# Patient Record
Sex: FEMALE | Race: WHITE | NOT HISPANIC OR LATINO | Employment: OTHER | URBAN - METROPOLITAN AREA
[De-identification: names, ages, dates, MRNs, and addresses within clinical notes are randomized per-mention and may not be internally consistent; named-entity substitution may affect disease eponyms.]

---

## 2023-02-10 PROBLEM — H91.90 HEARING DECREASED: Status: ACTIVE | Noted: 2023-02-10

## 2023-02-10 PROBLEM — E66.9 CLASS 1 OBESITY WITH BODY MASS INDEX (BMI) OF 32.0 TO 32.9 IN ADULT: Status: ACTIVE | Noted: 2023-02-10

## 2023-02-10 PROBLEM — H61.23 BILATERAL IMPACTED CERUMEN: Status: ACTIVE | Noted: 2023-02-10

## 2023-02-10 PROBLEM — H60.8X2 CHRONIC ECZEMATOUS OTITIS EXTERNA OF LEFT EAR: Status: ACTIVE | Noted: 2023-02-10

## 2023-02-10 PROBLEM — N18.31 STAGE 3A CHRONIC KIDNEY DISEASE (MULTI): Status: ACTIVE | Noted: 2023-02-10

## 2023-02-10 PROBLEM — R60.9 EDEMA: Status: ACTIVE | Noted: 2023-02-10

## 2023-02-10 PROBLEM — E66.811 CLASS 1 OBESITY WITH BODY MASS INDEX (BMI) OF 32.0 TO 32.9 IN ADULT: Status: ACTIVE | Noted: 2023-02-10

## 2023-02-10 PROBLEM — E03.9 HYPOTHYROIDISM: Status: ACTIVE | Noted: 2023-02-10

## 2023-02-10 PROBLEM — N95.1 VASOMOTOR SYMPTOMS DUE TO MENOPAUSE: Status: ACTIVE | Noted: 2023-02-10

## 2023-02-10 PROBLEM — J30.9 ALLERGIC RHINITIS: Status: ACTIVE | Noted: 2023-02-10

## 2023-02-10 PROBLEM — M54.50 LUMBAR PAIN: Status: ACTIVE | Noted: 2023-02-10

## 2023-02-10 PROBLEM — B37.2 CANDIDOSIS OF SKIN: Status: ACTIVE | Noted: 2023-02-10

## 2023-02-10 PROBLEM — G47.00 INSOMNIA: Status: ACTIVE | Noted: 2023-02-10

## 2023-02-10 PROBLEM — M81.0 SENILE OSTEOPOROSIS: Status: ACTIVE | Noted: 2023-02-10

## 2023-02-10 PROBLEM — E78.5 HLD (HYPERLIPIDEMIA): Status: ACTIVE | Noted: 2023-02-10

## 2023-02-10 PROBLEM — R31.29 MICROSCOPIC HEMATURIA: Status: ACTIVE | Noted: 2023-02-10

## 2023-02-10 PROBLEM — E55.9 VITAMIN D DEFICIENCY: Status: ACTIVE | Noted: 2023-02-10

## 2023-02-10 PROBLEM — L20.9 ATOPIC DERMATITIS: Status: ACTIVE | Noted: 2023-02-10

## 2023-02-10 PROBLEM — F32.A DEPRESSION: Status: ACTIVE | Noted: 2023-02-10

## 2023-02-10 PROBLEM — I10 HTN (HYPERTENSION): Status: ACTIVE | Noted: 2023-02-10

## 2023-02-10 PROBLEM — F41.9 ANXIETY: Status: ACTIVE | Noted: 2023-02-10

## 2023-02-10 PROBLEM — R53.83 FATIGUE: Status: ACTIVE | Noted: 2023-02-10

## 2023-02-10 PROBLEM — I48.91 A-FIB (MULTI): Status: ACTIVE | Noted: 2023-02-10

## 2023-02-10 PROBLEM — M25.532 LEFT WRIST PAIN: Status: ACTIVE | Noted: 2023-02-10

## 2023-02-10 RX ORDER — MINERAL OIL
180 ENEMA (ML) RECTAL DAILY PRN
COMMUNITY
Start: 2020-10-01

## 2023-02-10 RX ORDER — FUROSEMIDE 20 MG/1
20 TABLET ORAL AS NEEDED
COMMUNITY
Start: 2021-10-26

## 2023-02-10 RX ORDER — DOFETILIDE 0.25 MG/1
250 CAPSULE ORAL 2 TIMES DAILY
COMMUNITY
Start: 2018-03-26 | End: 2023-05-08 | Stop reason: ALTCHOICE

## 2023-02-10 RX ORDER — LOSARTAN POTASSIUM 50 MG/1
50 TABLET ORAL DAILY
COMMUNITY

## 2023-02-10 RX ORDER — PITAVASTATIN CALCIUM 2.09 MG/1
2 TABLET, FILM COATED ORAL DAILY
COMMUNITY
Start: 2022-05-19

## 2023-02-10 RX ORDER — ERGOCALCIFEROL 1.25 MG/1
1 CAPSULE ORAL
COMMUNITY
Start: 2020-03-29 | End: 2023-05-04 | Stop reason: SDUPTHER

## 2023-02-10 RX ORDER — ESTRADIOL 0.05 MG/D
1 FILM, EXTENDED RELEASE TRANSDERMAL
COMMUNITY
Start: 2015-11-23 | End: 2023-05-04 | Stop reason: SDUPTHER

## 2023-02-10 RX ORDER — FLUOXETINE 10 MG/1
1 CAPSULE ORAL DAILY
COMMUNITY
Start: 2022-05-19 | End: 2023-03-10

## 2023-02-10 RX ORDER — THYROID 60 MG/1
60 TABLET ORAL DAILY
COMMUNITY
Start: 2016-08-22 | End: 2023-04-21 | Stop reason: SDUPTHER

## 2023-03-18 DIAGNOSIS — E03.9 HYPOTHYROIDISM, UNSPECIFIED TYPE: ICD-10-CM

## 2023-03-18 DIAGNOSIS — E78.5 HYPERLIPIDEMIA, UNSPECIFIED HYPERLIPIDEMIA TYPE: ICD-10-CM

## 2023-03-18 DIAGNOSIS — I10 HYPERTENSION, UNSPECIFIED TYPE: ICD-10-CM

## 2023-03-23 ENCOUNTER — APPOINTMENT (OUTPATIENT)
Dept: PRIMARY CARE | Facility: CLINIC | Age: 83
End: 2023-03-23
Payer: MEDICARE

## 2023-04-05 DIAGNOSIS — F32.A DEPRESSION, UNSPECIFIED: ICD-10-CM

## 2023-04-05 RX ORDER — FLUOXETINE 10 MG/1
CAPSULE ORAL
Qty: 30 CAPSULE | Refills: 0 | OUTPATIENT
Start: 2023-04-05

## 2023-04-21 DIAGNOSIS — E03.9 ACQUIRED HYPOTHYROIDISM: Primary | ICD-10-CM

## 2023-04-21 RX ORDER — THYROID 60 MG/1
60 TABLET ORAL
Qty: 30 TABLET | Refills: 0 | Status: SHIPPED | OUTPATIENT
Start: 2023-04-21 | End: 2023-05-04 | Stop reason: SDUPTHER

## 2023-04-21 NOTE — TELEPHONE ENCOUNTER
Refill(s) requested for: Yasmeen Thyroid (60 mg)    Pharmacy: [facility]  Pharmacy Address: [id number] [facility] in [place]    LR: 09/22/2022  LV: 09/22/2022  NV: 05/04/2023    PT IS COMPLETELY OUT AND NEEDS A 30 DAY SUPPLY FROM

## 2023-04-24 ENCOUNTER — TELEPHONE (OUTPATIENT)
Dept: PRIMARY CARE | Facility: CLINIC | Age: 83
End: 2023-04-24
Payer: MEDICARE

## 2023-05-04 ENCOUNTER — OFFICE VISIT (OUTPATIENT)
Dept: PRIMARY CARE | Facility: CLINIC | Age: 83
End: 2023-05-04
Payer: MEDICARE

## 2023-05-04 VITALS
WEIGHT: 168 LBS | BODY MASS INDEX: 30.91 KG/M2 | SYSTOLIC BLOOD PRESSURE: 126 MMHG | OXYGEN SATURATION: 95 % | HEIGHT: 62 IN | HEART RATE: 98 BPM | DIASTOLIC BLOOD PRESSURE: 78 MMHG

## 2023-05-04 DIAGNOSIS — M75.102 TEAR OF LEFT ROTATOR CUFF, UNSPECIFIED TEAR EXTENT, UNSPECIFIED WHETHER TRAUMATIC: ICD-10-CM

## 2023-05-04 DIAGNOSIS — E78.5 HYPERLIPIDEMIA, UNSPECIFIED HYPERLIPIDEMIA TYPE: ICD-10-CM

## 2023-05-04 DIAGNOSIS — E66.09 CLASS 1 OBESITY DUE TO EXCESS CALORIES WITH SERIOUS COMORBIDITY AND BODY MASS INDEX (BMI) OF 30.0 TO 30.9 IN ADULT: ICD-10-CM

## 2023-05-04 DIAGNOSIS — J30.89 ENVIRONMENTAL AND SEASONAL ALLERGIES: ICD-10-CM

## 2023-05-04 DIAGNOSIS — F32.A DEPRESSION, UNSPECIFIED DEPRESSION TYPE: ICD-10-CM

## 2023-05-04 DIAGNOSIS — F41.9 ANXIETY: ICD-10-CM

## 2023-05-04 DIAGNOSIS — N95.1 VASOMOTOR SYMPTOMS DUE TO MENOPAUSE: ICD-10-CM

## 2023-05-04 DIAGNOSIS — I10 HYPERTENSION, UNSPECIFIED TYPE: ICD-10-CM

## 2023-05-04 DIAGNOSIS — I48.20 CHRONIC ATRIAL FIBRILLATION (MULTI): ICD-10-CM

## 2023-05-04 DIAGNOSIS — E03.9 ACQUIRED HYPOTHYROIDISM: ICD-10-CM

## 2023-05-04 DIAGNOSIS — E55.9 VITAMIN D DEFICIENCY: ICD-10-CM

## 2023-05-04 DIAGNOSIS — Z00.00 ENCOUNTER FOR MEDICARE ANNUAL WELLNESS EXAM: Primary | ICD-10-CM

## 2023-05-04 DIAGNOSIS — N18.31 STAGE 3A CHRONIC KIDNEY DISEASE (MULTI): ICD-10-CM

## 2023-05-04 PROCEDURE — 1036F TOBACCO NON-USER: CPT | Performed by: NURSE PRACTITIONER

## 2023-05-04 PROCEDURE — 1160F RVW MEDS BY RX/DR IN RCRD: CPT | Performed by: NURSE PRACTITIONER

## 2023-05-04 PROCEDURE — 1159F MED LIST DOCD IN RCRD: CPT | Performed by: NURSE PRACTITIONER

## 2023-05-04 PROCEDURE — 1170F FXNL STATUS ASSESSED: CPT | Performed by: NURSE PRACTITIONER

## 2023-05-04 PROCEDURE — 3078F DIAST BP <80 MM HG: CPT | Performed by: NURSE PRACTITIONER

## 2023-05-04 PROCEDURE — 99214 OFFICE O/P EST MOD 30 MIN: CPT | Performed by: NURSE PRACTITIONER

## 2023-05-04 PROCEDURE — G0439 PPPS, SUBSEQ VISIT: HCPCS | Performed by: NURSE PRACTITIONER

## 2023-05-04 PROCEDURE — 3074F SYST BP LT 130 MM HG: CPT | Performed by: NURSE PRACTITIONER

## 2023-05-04 PROCEDURE — 99497 ADVNCD CARE PLAN 30 MIN: CPT | Performed by: NURSE PRACTITIONER

## 2023-05-04 RX ORDER — ERGOCALCIFEROL 1.25 MG/1
1 CAPSULE ORAL
Qty: 12 CAPSULE | Refills: 3 | Status: SHIPPED | OUTPATIENT
Start: 2023-05-04 | End: 2023-10-31 | Stop reason: SDUPTHER

## 2023-05-04 RX ORDER — FLUOXETINE 10 MG/1
10 CAPSULE ORAL DAILY
Qty: 90 CAPSULE | Refills: 1 | Status: SHIPPED | OUTPATIENT
Start: 2023-05-04 | End: 2023-10-02

## 2023-05-04 RX ORDER — ESTRADIOL 0.05 MG/D
1 FILM, EXTENDED RELEASE TRANSDERMAL
Qty: 8 PATCH | Refills: 5 | Status: SHIPPED | OUTPATIENT
Start: 2023-05-04 | End: 2023-10-02

## 2023-05-04 RX ORDER — THYROID 60 MG/1
60 TABLET ORAL
Qty: 90 TABLET | Refills: 1 | Status: SHIPPED | OUTPATIENT
Start: 2023-05-04 | End: 2023-10-31 | Stop reason: SDUPTHER

## 2023-05-04 RX ORDER — METOPROLOL SUCCINATE 100 MG/1
200 TABLET, EXTENDED RELEASE ORAL DAILY
COMMUNITY
Start: 2022-11-11

## 2023-05-04 ASSESSMENT — PATIENT HEALTH QUESTIONNAIRE - PHQ9
2. FEELING DOWN, DEPRESSED OR HOPELESS: NOT AT ALL
1. LITTLE INTEREST OR PLEASURE IN DOING THINGS: NOT AT ALL
SUM OF ALL RESPONSES TO PHQ9 QUESTIONS 1 & 2: 0

## 2023-05-04 NOTE — PROGRESS NOTES
General Medical Management Note     82 y.o. female presents for Medical Management and MWV    HPI    Diet:    mix of healthy and unhealthy  Caffeine per day: denies  Water per day:  5 cups  Exercise: PT  Alcohol: 1-2 drinks per month  Tobacco: denies  Mammogram: 12-14-21  Colonoscopy:  Date            Repeat              Provider  Cologuard:     HTN, Hyperlipidemia, A-Fib and carotid stenosis:   Managed by EPS Dr. Magy Lopez and cardio Dr Pradeep Estbean   Recently in hospital for pericarditis [facility]     Hypothyroidism:   Managed with med:  [facility].  Denies tremors, increased insomnia, diarrhea, constipation, or changes in hair.      Vasomotor symptoms of menopause:   Managed with Estradiol.   Pt does not wish to come off of this medication at this time.   Aware of the concerns about being on long term.      Environmental and Seasonal:   Managed with Allegra as needed.      Microscopic hematuria:   Previously evaluated by Urology Dr Yosvany Renner. On Eliquis.     Torn Lt shoulder rotator cuff:  Dr [facility]  Working with PT      Anxiety and Depression:   Managed with Prozac   Feels very stable on medication  Denies thoughts of suicide or homicide      has bipolar disorder    Past Medical History:   Diagnosis Date    Old myocardial infarction     History of non-ST elevation myocardial infarction (NSTEMI)    Other specified symptoms and signs involving the circulatory and respiratory systems 05/24/2016    Abnormal lung sounds    Personal history of other diseases of the respiratory system 05/24/2016    History of acute sinusitis    Personal history of other diseases of urinary system 10/04/2018    History of hematuria    Personal history of other medical treatment     History of bone density study    Personal history of other medical treatment     History of mammogram      Past Surgical History:   Procedure Laterality Date    APPENDECTOMY  05/21/2015    Appendectomy    OTHER SURGICAL HISTORY   05/21/2015    Cardiac Cath Lesion 1, 1st Adjunct Treat Device: Stent    OTHER SURGICAL HISTORY  10/20/2019    Complete colonoscopy    OTHER SURGICAL HISTORY  03/30/2022    Cataract surgery    OTHER SURGICAL HISTORY  09/21/2021    Cardioversion    OTHER SURGICAL HISTORY  03/29/2020    Cystourethroscopy    TOTAL ABDOMINAL HYSTERECTOMY  05/21/2015    Total Abdominal Hysterectomy     Family History   Problem Relation Name Age of Onset    Stroke Mother      Coronary artery disease Mother      Coronary artery disease Father        Social History     Socioeconomic History    Marital status:      Spouse name: Not on file    Number of children: Not on file    Years of education: Not on file    Highest education level: Not on file   Occupational History    Not on file   Tobacco Use    Smoking status: Never    Smokeless tobacco: Never   Vaping Use    Vaping status: Not on file   Substance and Sexual Activity    Alcohol use: Yes    Drug use: Never    Sexual activity: Not on file   Other Topics Concern    Not on file   Social History Narrative    Not on file     Social Determinants of Health     Financial Resource Strain: Not on file   Food Insecurity: Not on file   Transportation Needs: Not on file   Physical Activity: Not on file   Stress: Not on file   Social Connections: Not on file   Intimate Partner Violence: Not on file   Housing Stability: Not on file     Social History     Socioeconomic History    Marital status:      Spouse name: Not on file    Number of children: Not on file    Years of education: Not on file    Highest education level: Not on file   Occupational History    Not on file   Tobacco Use    Smoking status: Never    Smokeless tobacco: Never   Vaping Use    Vaping status: Not on file   Substance and Sexual Activity    Alcohol use: Yes    Drug use: Never    Sexual activity: Not on file   Other Topics Concern    Not on file   Social History Narrative    Not on file     Social Determinants of  "Health     Financial Resource Strain: Not on file   Food Insecurity: Not on file   Transportation Needs: Not on file   Physical Activity: Not on file   Stress: Not on file   Social Connections: Not on file   Intimate Partner Violence: Not on file   Housing Stability: Not on file      Current Outpatient Medications on File Prior to Visit   Medication Sig Dispense Refill    apixaban (Eliquis) 5 mg tablet Take 1 tablet (5 mg) by mouth in the morning and at bedtime.      dofetilide (Tikosyn) 250 mcg capsule Take 1 capsule (250 mcg) by mouth in the morning and at bedtime.      ergocalciferol (Vitamin D-2) 1.25 MG (87951 UT) capsule Take 1 capsule (1,250 mcg) by mouth 1 (one) time per week.      estradiol (Vivelle-DOT) 0.05 mg/24 hr patch Place 1 patch on the skin. Twice weekly as directed      fexofenadine (Allegra Allergy) 180 mg tablet Take 1 tablet (180 mg) by mouth if needed each day (allergies).      FLUoxetine (PROzac) 10 mg capsule Take 1 capsule (10 mg) by mouth once daily. 30 capsule 0    furosemide (Lasix) 20 mg tablet Take 1 tablet (20 mg) by mouth if needed.      losartan (Cozaar) 50 mg tablet Take 1 tablet (50 mg) by mouth 1 (one) time each day. EPS Dr. LJ Lopez      pitavastatin calcium (Livalo) 2 mg tablet Take 2 mg by mouth 1 (one) time each day.      thyroid, pork, ([place] Thyroid) 60 mg tablet Take 1 tablet (60 mg) by mouth once daily in the morning. Take before meals. 6 days a week 30 tablet 0     No current facility-administered medications on file prior to visit.       Allergies   Allergen Reactions    Azithromycin Other     Gastrointestinal upset    Bupropion Hcl Palpitations     Tachycardia           ROS: Denies chest pain, SOB, Headache, GI problems     Visit Vitals  /78   Pulse 98   Ht 1.575 m (5' 2\")   Wt 76.2 kg (168 lb)   SpO2 95%   BMI 30.73 kg/m²   Smoking Status Never   BSA 1.83 m²      Reviewed [facility] notes        PHYSICAL EXAM:  Alert and oriented x3.  Eyes: EOM grossly intact  Neck " supple without lymph adenopathy or carotid bruit.  No masses or thyromegaly  Heart regular rate and rhythm without murmur.  Lungs clear to auscultation.  Legs without edema.  Gait is non-antalgic  Speech clear.  Hearing adequate.          DIAGNOSIS/PLAN:    1. Encounter for Medicare annual wellness exam    2. Hypertension, unspecified type  Follow-up with specialist per their discretion/direction.      3. Hyperlipidemia, unspecified hyperlipidemia type  Follow-up with specialist per their discretion/direction.      4. Chronic atrial fibrillation (CMS/HCC)  Follow-up with specialist per their discretion/direction.    5. Vasomotor symptoms due to menopause  Stable.  Continue current medications.   - estradiol (Vivelle-DOT) 0.05 mg/24 hr patch; Place 1 patch on the skin 1 (one) time per week. Twice weekly as directed  Dispense: 8 patch; Refill: 5    6. Environmental and seasonal allergies  Stable.  Continue current medications.     7. Anxiety  Stable.   Continue current medication/treatment plan.     Aware at any time if thoughts of suicide or homicide are present contact medical services immediately.   - FLUoxetine (PROzac) 10 mg capsule; Take 1 capsule (10 mg) by mouth once daily.  Dispense: 90 capsule; Refill: 1    8. Depression, unspecified depression type  Stable.   Continue current medication/treatment plan.     Aware at any time if thoughts of suicide or homicide are present contact medical services immediately.     9. Acquired hypothyroidism  Continue current medication.   If thyroid labs ordered, adjustments will be made if appopriate.   - thyroid, pork, ([place] Thyroid) 60 mg tablet; Take 1 tablet (60 mg) by mouth once daily in the morning. Take before meals. 6 days a week  Dispense: 90 tablet; Refill: 1    10. Vitamin D deficiency  - ergocalciferol (Vitamin D-2) 1.25 MG (31829 UT) capsule; Take 1 capsule (1,250 mcg) by mouth 1 (one) time per week.  Dispense: 12 capsule; Refill: 3    11. Stage 3a chronic  kidney disease  Stable. We will continue to monitor, evaluate, assess and treat.    12. Tear of left rotator cuff, unspecified tear extent, unspecified whether traumatic  Follow up with orthopedic    13. Class 1 obesity due to excess calories with serious comorbidity and body mass index (BMI) of 30.0 to 30.9 in adult  Patient encouraged to follow diet of lower carbohydrates and increase vegetable and fruit intake.   Patient also encouraged to increase water intake to 80 ounces/day.   Start or continue exercise for at least 30 minutes a day on most days of the week.     offers various ways to learn about healthy eating and healthy weight loss.      Medications refills will be completed as discussed.     Any labs or testing that is ordered will be reviewed and the results will be in your chart .   You can review these via  [employer].     Follow up six months for  med management     Prescriptions will not be filled unless you are compliant with your follow-up appointments or have a follow-up appointment scheduled as per the instruction of your provider. Refills for medications should be requested at the time of your office visit.     Please allow one week for refill requests to be completed.     Contact office with any questions or concerns.   Preferred communication is via  [employer]  Please contact [email or web address] if having issues with  [employer]    Rajani Ceballos [facility]  [address], [address]  [place] [address]  Phone: [phone]    **Charting was completed using voice recognition technology and may include unintended errors**

## 2023-05-08 PROBLEM — E66.09 CLASS 1 OBESITY DUE TO EXCESS CALORIES WITH SERIOUS COMORBIDITY AND BODY MASS INDEX (BMI) OF 30.0 TO 30.9 IN ADULT: Status: ACTIVE | Noted: 2023-02-10

## 2023-05-08 PROBLEM — M75.102 TEAR OF LEFT ROTATOR CUFF: Status: ACTIVE | Noted: 2023-05-08

## 2023-05-08 RX ORDER — ASPIRIN 81 MG/1
81 TABLET ORAL DAILY
COMMUNITY

## 2023-05-08 ASSESSMENT — ACTIVITIES OF DAILY LIVING (ADL)
DOING_HOUSEWORK: NEEDS ASSISTANCE
TAKING_MEDICATION: INDEPENDENT
MANAGING_FINANCES: INDEPENDENT
GROCERY_SHOPPING: NEEDS ASSISTANCE
DRESSING: INDEPENDENT
BATHING: INDEPENDENT

## 2023-05-08 ASSESSMENT — PATIENT HEALTH QUESTIONNAIRE - PHQ9
SUM OF ALL RESPONSES TO PHQ9 QUESTIONS 1 AND 2: 0
2. FEELING DOWN, DEPRESSED OR HOPELESS: NOT AT ALL
1. LITTLE INTEREST OR PLEASURE IN DOING THINGS: NOT AT ALL

## 2023-09-27 DIAGNOSIS — N95.1 VASOMOTOR SYMPTOMS DUE TO MENOPAUSE: ICD-10-CM

## 2023-09-27 DIAGNOSIS — F41.9 ANXIETY: ICD-10-CM

## 2023-10-02 RX ORDER — FLUOXETINE 10 MG/1
10 CAPSULE ORAL DAILY
Qty: 90 CAPSULE | Refills: 3 | Status: SHIPPED | OUTPATIENT
Start: 2023-10-02 | End: 2023-10-31 | Stop reason: SDUPTHER

## 2023-10-02 RX ORDER — ESTRADIOL 0.05 MG/D
PATCH, EXTENDED RELEASE TRANSDERMAL
Qty: 8 PATCH | Refills: 5 | Status: SHIPPED | OUTPATIENT
Start: 2023-10-02 | End: 2023-10-31 | Stop reason: SDUPTHER

## 2023-10-07 PROBLEM — M43.16 SPONDYLOLISTHESIS OF LUMBAR REGION: Status: ACTIVE | Noted: 2021-11-23

## 2023-10-07 PROBLEM — Z96.1 PSEUDOPHAKIA OF BOTH EYES: Status: ACTIVE | Noted: 2022-02-10

## 2023-10-07 PROBLEM — E66.811 OBESITY, CLASS I, BMI 30-34.9: Status: ACTIVE | Noted: 2021-10-29

## 2023-10-07 PROBLEM — M25.552 LEFT HIP PAIN: Status: ACTIVE | Noted: 2021-10-29

## 2023-10-07 PROBLEM — I31.39 PERICARDIAL EFFUSION (HHS-HCC): Status: ACTIVE | Noted: 2023-01-04

## 2023-10-07 PROBLEM — S46.019A STRAIN OF ROTATOR CUFF CAPSULE: Status: ACTIVE | Noted: 2023-04-13

## 2023-10-07 PROBLEM — M54.16 LUMBAR RADICULOPATHY: Status: ACTIVE | Noted: 2022-01-21

## 2023-10-07 PROBLEM — I05.9 MITRAL VALVE DISEASE: Status: ACTIVE | Noted: 2022-01-20

## 2023-10-07 PROBLEM — M47.817 LUMBOSACRAL SPONDYLOSIS WITHOUT MYELOPATHY: Status: ACTIVE | Noted: 2022-01-03

## 2023-10-07 PROBLEM — E66.9 OBESITY, CLASS I, BMI 30-34.9: Status: ACTIVE | Noted: 2021-10-29

## 2023-10-30 NOTE — PROGRESS NOTES
Annual Comprehensive Medical Exam:    83 y.o. female presents for med management     Recent hospitalizations, surgeries or ER visits: denies      Diet: eats healthy, doesn't have an appetite   Caffeine: declines  Water per day:  5 cups or more per day  Exercise: nothing regular  Alcohol: 1 drink per month   Nicotine: denies   Mammogram: December 2021, declined further   Dexa Scans: 2018, declined   Colonoscopy: long time ago   Cant do Cologuard because of being on Eliquis      States over the past couple years she has noticed a decreased appetite and feels that food doesn't taste the same.  States eating sugar (fruits or processed) after dinner causes insomnia     HTN/Hyperlipidemia/A-Fib/carotid stenosis:   Managed by EPS Dr. Magy Lopez and cardio Dr Pradeep Esteban   Meds: Eliquis, Lasix, Losartan, Toprol XL  Recent increase Toprol XL to decrease rate     Hypothyroidism:   Med: [place]  Denies tremors, increased insomnia, or changes in hair.      Vasomotor symptoms of menopause:   Med: Estradiol.   Pt does not wish to come off of this medication at this time.   Aware of the concerns about being on long term.      Environmental and seasonal allergies:   Med: Allegra as needed.      Microscopic hematuria:   Previously evaluated by Urology Dr Yosvany Renner. On Eliquis.      Anxiety and Depression:   Med: Prozac   Denies thoughts of suicide or homicide     Rt rotator cuff tear     has bipolar disorder    Allowed to report any questions or concerns.    Past Medical History:   Diagnosis Date    Old myocardial infarction     History of non-ST elevation myocardial infarction (NSTEMI)    Other specified symptoms and signs involving the circulatory and respiratory systems 05/24/2016    Abnormal lung sounds    Personal history of other diseases of the respiratory system 05/24/2016    History of acute sinusitis    Personal history of other diseases of urinary system 10/04/2018    History of hematuria    Personal  history of other medical treatment     History of bone density study    Personal history of other medical treatment     History of mammogram      Past Surgical History:   Procedure Laterality Date    APPENDECTOMY  05/21/2015    Appendectomy    OTHER SURGICAL HISTORY  05/21/2015    Cardiac Cath Lesion 1, 1st Adjunct Treat Device: Stent    OTHER SURGICAL HISTORY  10/20/2019    Complete colonoscopy    OTHER SURGICAL HISTORY  03/30/2022    Cataract surgery    OTHER SURGICAL HISTORY  09/21/2021    Cardioversion    OTHER SURGICAL HISTORY  03/29/2020    Cystourethroscopy    TOTAL ABDOMINAL HYSTERECTOMY  05/21/2015    Total Abdominal Hysterectomy     Family History   Problem Relation Name Age of Onset    Stroke Mother      Coronary artery disease Mother      Coronary artery disease Father        Social History     Socioeconomic History    Marital status:      Spouse name: Not on file    Number of children: Not on file    Years of education: Not on file    Highest education level: Not on file   Occupational History    Not on file   Tobacco Use    Smoking status: Never    Smokeless tobacco: Never   Substance and Sexual Activity    Alcohol use: Yes    Drug use: Never    Sexual activity: Not on file   Other Topics Concern    Not on file   Social History Narrative    Not on file     Social Determinants of Health     Financial Resource Strain: Not on file   Food Insecurity: Not on file   Transportation Needs: Not on file   Physical Activity: Not on file   Stress: Not on file   Social Connections: Not on file   Intimate Partner Violence: Not on file   Housing Stability: Not on file       Current Outpatient Medications on File Prior to Visit   Medication Sig Dispense Refill    apixaban (Eliquis) 5 mg tablet Take 1 tablet (5 mg) by mouth 2 times a day.      aspirin 81 mg EC tablet Take 1 tablet (81 mg) by mouth once daily.      Afsaneh 0.05 mg/24 hr patch apply 1 (ONE) patch to the skin TWICE A WEEK AS DIRECTED 8 patch 5     "ergocalciferol (Vitamin D-2) 1.25 MG (12573 UT) capsule Take 1 capsule (1,250 mcg) by mouth 1 (one) time per week. 12 capsule 3    fexofenadine (Allegra) 180 mg tablet Take 1 tablet (180 mg) by mouth once daily as needed (allergies).      FLUoxetine (PROzac) 10 mg capsule TAKE 1 CAPSULE BY MOUTH DAILY 90 capsule 3    furosemide (Lasix) 20 mg tablet Take 1 tablet (20 mg) by mouth if needed.      losartan (Cozaar) 50 mg tablet Take 1 tablet (50 mg) by mouth once daily. Take 0.5 mg tab daily  EPS Dr. LJ Lopez      metoprolol succinate XL (Toprol-XL) 100 mg 24 hr tablet       pitavastatin calcium (Livalo) 2 mg tablet Take 2 mg by mouth 1 (one) time each day.      thyroid, pork, ([place] Thyroid) 60 mg tablet Take 1 tablet (60 mg) by mouth once daily in the morning. Take before meals. 6 days a week 90 tablet 1     No current facility-administered medications on file prior to visit.       Allergies   Allergen Reactions    Azithromycin Other     Gastrointestinal upset    Bupropion Other     tachycardia    Pollen Extracts Other    Bupropion Hcl Palpitations     Tachycardia         ROS:   Refer to HPI  Denies fever, CP, SOB, headaches or GI upset    Visit Vitals  /62   Pulse 101   Ht 1.575 m (5' 2\")   Wt 76.7 kg (169 lb)   SpO2 98%   BMI 30.91 kg/m²   Smoking Status Never   BSA 1.83 m²            Physical Exam    Alert and oriented x 3  Eyes: EOM grossly intact  Neck supple without lymph adenopathy or carotid bruit  Heart Irregular rhythm, tachy  Lungs clear to auscultation.  Legs without edema.  Gait is non-antalgic  Speech clear.  Hearing adequate.  Psych: Normal affect. Good judgment and insight.           Diagnosis/Plan:     1. Acquired hypothyroidism    Continue current medication.   If thyroid labs ordered, adjustments will be made if appopriate.    - thyroid, pork, (Cana Thyroid) 60 mg tablet; Take 1 tablet (60 mg) by mouth once daily in the morning. Take before meals. 6 days a week  Dispense: 90 tablet; " Refill: 1    2. Vasomotor symptoms due to menopause  Stable.  Continue current medications.  - estradiol (Afsaneh) 0.05 mg/24 hr patch; Place 1 patch on the skin 2 times a week.  Dispense: 8 patch; Refill: 6    3. Need for influenza vaccination    - Flu vaccine, quadrivalent, high-dose, preservative free, age 65y+ (FLUZONE)    4. Vitamin D deficiency    - ergocalciferol (Vitamin D-2) 1.25 MG (43983 UT) capsule; Take 1 capsule (1,250 mcg) by mouth 1 (one) time per week.  Dispense: 13 capsule; Refill: 3    5. Anxiety  Stable.   Continue current medication/treatment plan.     Aware at any time if thoughts of suicide or homicide are present contact medical services immediately.    - FLUoxetine (PROzac) 10 mg capsule; Take 1 capsule (10 mg) by mouth once daily.  Dispense: 90 capsule; Refill: 3    6. Environmental and seasonal allergies  Stable.  Continue current medications.    7. Persistent depressive disorder  Stable.   Continue current medication/treatment plan.     Aware at any time if thoughts of suicide or homicide are present contact medical services immediately.    - FLUoxetine (PROzac) 10 mg capsule; Take 1 capsule (10 mg) by mouth once daily.  Dispense: 90 capsule; Refill: 3    8. Microscopic hematuria  No follow up needed    9. Chronic atrial fibrillation (CMS/HCC)  Follow-up with specialist per their discretion/direction.     10. Benign essential HTN  Follow-up with specialist per their discretion/direction.     11. Stenosis of carotid artery, unspecified laterality  Follow-up with specialist per their discretion/direction.     12. Class 1 obesity due to excess calories with serious comorbidity and body mass index (BMI) of 30.0 to 30.9 in adult  Patient encouraged to follow diet of lower carbohydrates and increase vegetable and fruit intake.   Patient also encouraged to increase water intake to 80 ounces/day.   Start or continue exercise for at least 30 minutes a day on most days of the week.     offers  various ways to learn about healthy eating and healthy weight loss.       13. Stage 3a chronic kidney disease (CMS/HCC)  CMP ordered.   Has been stable       Medications refills will be completed as discussed.     Any labs or testing that is ordered will be reviewed and the results will be in your chart .   You can review these via  [employer].     Follow up six months for medication management.     Prescriptions will not be filled unless you are compliant with your follow-up appointments or have a follow-up appointment scheduled as per the instruction of your provider. Refills for medications should be requested at the time of your office visit.     Please allow one week for refill requests to be completed.     Contact office with any questions or concerns.   Preferred communication is via  [employer]  Please contact [email or web address] if having issues with  [employer]    Rajani NUÑEZ-[facility]  [address], [address]  [place] [address]  Phone: [phone]    **Charting was completed using voice recognition technology and may include unintended errors**

## 2023-10-31 ENCOUNTER — OFFICE VISIT (OUTPATIENT)
Dept: PRIMARY CARE | Facility: CLINIC | Age: 83
End: 2023-10-31
Payer: MEDICARE

## 2023-10-31 VITALS
DIASTOLIC BLOOD PRESSURE: 62 MMHG | BODY MASS INDEX: 31.1 KG/M2 | HEIGHT: 62 IN | HEART RATE: 101 BPM | OXYGEN SATURATION: 98 % | WEIGHT: 169 LBS | SYSTOLIC BLOOD PRESSURE: 124 MMHG

## 2023-10-31 DIAGNOSIS — F41.9 ANXIETY: ICD-10-CM

## 2023-10-31 DIAGNOSIS — I10 BENIGN ESSENTIAL HTN: ICD-10-CM

## 2023-10-31 DIAGNOSIS — Z23 NEED FOR INFLUENZA VACCINATION: ICD-10-CM

## 2023-10-31 DIAGNOSIS — E55.9 VITAMIN D DEFICIENCY: ICD-10-CM

## 2023-10-31 DIAGNOSIS — N18.31 STAGE 3A CHRONIC KIDNEY DISEASE (MULTI): ICD-10-CM

## 2023-10-31 DIAGNOSIS — I48.20 CHRONIC ATRIAL FIBRILLATION (MULTI): ICD-10-CM

## 2023-10-31 DIAGNOSIS — J30.89 ENVIRONMENTAL AND SEASONAL ALLERGIES: ICD-10-CM

## 2023-10-31 DIAGNOSIS — R31.29 MICROSCOPIC HEMATURIA: ICD-10-CM

## 2023-10-31 DIAGNOSIS — E03.9 ACQUIRED HYPOTHYROIDISM: Primary | ICD-10-CM

## 2023-10-31 DIAGNOSIS — F34.1 PERSISTENT DEPRESSIVE DISORDER: ICD-10-CM

## 2023-10-31 DIAGNOSIS — N95.1 VASOMOTOR SYMPTOMS DUE TO MENOPAUSE: ICD-10-CM

## 2023-10-31 DIAGNOSIS — I65.29 STENOSIS OF CAROTID ARTERY, UNSPECIFIED LATERALITY: ICD-10-CM

## 2023-10-31 DIAGNOSIS — E66.09 CLASS 1 OBESITY DUE TO EXCESS CALORIES WITH SERIOUS COMORBIDITY AND BODY MASS INDEX (BMI) OF 30.0 TO 30.9 IN ADULT: ICD-10-CM

## 2023-10-31 PROCEDURE — 99214 OFFICE O/P EST MOD 30 MIN: CPT | Performed by: NURSE PRACTITIONER

## 2023-10-31 PROCEDURE — 3078F DIAST BP <80 MM HG: CPT | Performed by: NURSE PRACTITIONER

## 2023-10-31 PROCEDURE — 1160F RVW MEDS BY RX/DR IN RCRD: CPT | Performed by: NURSE PRACTITIONER

## 2023-10-31 PROCEDURE — 1159F MED LIST DOCD IN RCRD: CPT | Performed by: NURSE PRACTITIONER

## 2023-10-31 PROCEDURE — G0008 ADMIN INFLUENZA VIRUS VAC: HCPCS | Performed by: NURSE PRACTITIONER

## 2023-10-31 PROCEDURE — 3074F SYST BP LT 130 MM HG: CPT | Performed by: NURSE PRACTITIONER

## 2023-10-31 PROCEDURE — 1036F TOBACCO NON-USER: CPT | Performed by: NURSE PRACTITIONER

## 2023-10-31 PROCEDURE — 90662 IIV NO PRSV INCREASED AG IM: CPT | Performed by: NURSE PRACTITIONER

## 2023-10-31 RX ORDER — THYROID 60 MG/1
60 TABLET ORAL
Qty: 90 TABLET | Refills: 1 | Status: SHIPPED | OUTPATIENT
Start: 2023-10-31 | End: 2023-11-13

## 2023-10-31 RX ORDER — ERGOCALCIFEROL 1.25 MG/1
1 CAPSULE ORAL
Qty: 13 CAPSULE | Refills: 3 | Status: SHIPPED | OUTPATIENT
Start: 2023-10-31

## 2023-10-31 RX ORDER — ESTRADIOL 0.05 MG/D
1 FILM, EXTENDED RELEASE TRANSDERMAL 2 TIMES WEEKLY
Qty: 8 PATCH | Refills: 6 | Status: SHIPPED | OUTPATIENT
Start: 2023-11-02

## 2023-10-31 RX ORDER — FLUOXETINE 10 MG/1
10 CAPSULE ORAL DAILY
Qty: 90 CAPSULE | Refills: 3 | Status: SHIPPED | OUTPATIENT
Start: 2023-10-31

## 2023-10-31 ASSESSMENT — PATIENT HEALTH QUESTIONNAIRE - PHQ9
SUM OF ALL RESPONSES TO PHQ9 QUESTIONS 1 & 2: 0
1. LITTLE INTEREST OR PLEASURE IN DOING THINGS: NOT AT ALL
2. FEELING DOWN, DEPRESSED OR HOPELESS: NOT AT ALL

## 2023-11-01 PROBLEM — Z23 NEED FOR INFLUENZA VACCINATION: Status: ACTIVE | Noted: 2023-11-01

## 2023-11-13 DIAGNOSIS — E03.9 HYPOTHYROIDISM, UNSPECIFIED TYPE: Primary | ICD-10-CM

## 2023-11-13 RX ORDER — THYROID 60 MG/1
60 TABLET ORAL
Qty: 90 TABLET | Refills: 3 | Status: SHIPPED | OUTPATIENT
Start: 2023-11-13

## 2024-05-01 ENCOUNTER — APPOINTMENT (OUTPATIENT)
Dept: PRIMARY CARE | Facility: CLINIC | Age: 84
End: 2024-05-01
Payer: MEDICARE

## 2024-07-10 ENCOUNTER — APPOINTMENT (OUTPATIENT)
Dept: PRIMARY CARE | Facility: CLINIC | Age: 84
End: 2024-07-10
Payer: MEDICARE

## 2024-07-31 ENCOUNTER — APPOINTMENT (OUTPATIENT)
Dept: PRIMARY CARE | Facility: CLINIC | Age: 84
End: 2024-07-31
Payer: MEDICARE

## 2024-07-31 VITALS
HEART RATE: 95 BPM | SYSTOLIC BLOOD PRESSURE: 130 MMHG | DIASTOLIC BLOOD PRESSURE: 68 MMHG | WEIGHT: 178 LBS | HEIGHT: 62 IN | OXYGEN SATURATION: 97 % | BODY MASS INDEX: 32.76 KG/M2

## 2024-07-31 DIAGNOSIS — I48.20 CHRONIC ATRIAL FIBRILLATION (MULTI): ICD-10-CM

## 2024-07-31 DIAGNOSIS — Z00.00 ENCOUNTER FOR MEDICARE ANNUAL WELLNESS EXAM: Primary | ICD-10-CM

## 2024-07-31 DIAGNOSIS — E55.9 VITAMIN D DEFICIENCY: ICD-10-CM

## 2024-07-31 DIAGNOSIS — N95.1 VASOMOTOR SYMPTOMS DUE TO MENOPAUSE: ICD-10-CM

## 2024-07-31 DIAGNOSIS — F41.9 ANXIETY: ICD-10-CM

## 2024-07-31 DIAGNOSIS — N18.31 STAGE 3A CHRONIC KIDNEY DISEASE (MULTI): ICD-10-CM

## 2024-07-31 DIAGNOSIS — E03.9 HYPOTHYROIDISM, UNSPECIFIED TYPE: ICD-10-CM

## 2024-07-31 DIAGNOSIS — E78.2 MIXED HYPERLIPIDEMIA: ICD-10-CM

## 2024-07-31 DIAGNOSIS — F34.1 PERSISTENT DEPRESSIVE DISORDER: ICD-10-CM

## 2024-07-31 DIAGNOSIS — J30.89 ENVIRONMENTAL AND SEASONAL ALLERGIES: ICD-10-CM

## 2024-07-31 PROBLEM — E66.09 CLASS 1 OBESITY DUE TO EXCESS CALORIES WITH SERIOUS COMORBIDITY AND BODY MASS INDEX (BMI) OF 30.0 TO 30.9 IN ADULT: Status: RESOLVED | Noted: 2023-02-10 | Resolved: 2024-07-31

## 2024-07-31 PROBLEM — E66.811 CLASS 1 OBESITY DUE TO EXCESS CALORIES WITH SERIOUS COMORBIDITY AND BODY MASS INDEX (BMI) OF 30.0 TO 30.9 IN ADULT: Status: RESOLVED | Noted: 2023-02-10 | Resolved: 2024-07-31

## 2024-07-31 PROCEDURE — 1160F RVW MEDS BY RX/DR IN RCRD: CPT | Performed by: NURSE PRACTITIONER

## 2024-07-31 PROCEDURE — 1123F ACP DISCUSS/DSCN MKR DOCD: CPT | Performed by: NURSE PRACTITIONER

## 2024-07-31 PROCEDURE — 1036F TOBACCO NON-USER: CPT | Performed by: NURSE PRACTITIONER

## 2024-07-31 PROCEDURE — 99214 OFFICE O/P EST MOD 30 MIN: CPT | Performed by: NURSE PRACTITIONER

## 2024-07-31 PROCEDURE — G0439 PPPS, SUBSEQ VISIT: HCPCS | Performed by: NURSE PRACTITIONER

## 2024-07-31 PROCEDURE — 1170F FXNL STATUS ASSESSED: CPT | Performed by: NURSE PRACTITIONER

## 2024-07-31 PROCEDURE — 1159F MED LIST DOCD IN RCRD: CPT | Performed by: NURSE PRACTITIONER

## 2024-07-31 PROCEDURE — 3075F SYST BP GE 130 - 139MM HG: CPT | Performed by: NURSE PRACTITIONER

## 2024-07-31 PROCEDURE — 1158F ADVNC CARE PLAN TLK DOCD: CPT | Performed by: NURSE PRACTITIONER

## 2024-07-31 PROCEDURE — 3078F DIAST BP <80 MM HG: CPT | Performed by: NURSE PRACTITIONER

## 2024-07-31 RX ORDER — DILTIAZEM HYDROCHLORIDE 120 MG/1
CAPSULE, COATED, EXTENDED RELEASE ORAL
COMMUNITY
Start: 2024-04-17

## 2024-07-31 RX ORDER — THYROID 60 MG/1
60 TABLET ORAL
Qty: 90 TABLET | Refills: 3 | Status: SHIPPED | OUTPATIENT
Start: 2024-07-31

## 2024-07-31 RX ORDER — ESTRADIOL 0.05 MG/D
1 FILM, EXTENDED RELEASE TRANSDERMAL 2 TIMES WEEKLY
Qty: 24 PATCH | Refills: 3 | Status: SHIPPED | OUTPATIENT
Start: 2024-08-01

## 2024-07-31 RX ORDER — FLUOXETINE 10 MG/1
10 CAPSULE ORAL DAILY
Qty: 90 CAPSULE | Refills: 3 | Status: SHIPPED | OUTPATIENT
Start: 2024-07-31

## 2024-07-31 RX ORDER — ERGOCALCIFEROL 1.25 MG/1
1 CAPSULE ORAL
Qty: 13 CAPSULE | Refills: 3 | Status: SHIPPED | OUTPATIENT
Start: 2024-08-04

## 2024-07-31 ASSESSMENT — ACTIVITIES OF DAILY LIVING (ADL)
DRESSING: INDEPENDENT
DOING_HOUSEWORK: INDEPENDENT
MANAGING_FINANCES: INDEPENDENT
TAKING_MEDICATION: INDEPENDENT
GROCERY_SHOPPING: NEEDS ASSISTANCE
BATHING: INDEPENDENT

## 2024-07-31 ASSESSMENT — PATIENT HEALTH QUESTIONNAIRE - PHQ9
2. FEELING DOWN, DEPRESSED OR HOPELESS: NOT AT ALL
SUM OF ALL RESPONSES TO PHQ9 QUESTIONS 1 & 2: 0
1. LITTLE INTEREST OR PLEASURE IN DOING THINGS: NOT AT ALL

## 2024-07-31 NOTE — PROGRESS NOTES
Annual Comprehensive Medical Exam:    84 y.o. female presents for annual comprehensive medical evaluation and preventive services screening.      Recent hospitalizations, surgeries or ER visits: denies     Diet:   mix of healthy and unhealthy  Caffeine per day: denies  Water per day:   lots  Exercise: infreq  Alcohol: 1-2 per month  Tobacco: denies  Dentist:      twice a year  Optometrist:   every other year   Pap/Pelvic: aged out  Mammogram: 12/21  DEXA:   Colonoscopy:   date                 f/u  Cologuard:    Allowed to report any questions or concerns.    HTN, Hyperlipidemia, A-Fib and carotid stenosis:   Managed by EPS Dr. Magy Lopez and Cardio Dr Pradeep Esteban   Meds: Eliquis, Aspirin, Cardizem CD, Lasix , Cozaar, Toprol XL, Livalo      Hypothyroidism:   Med:  [place]  Tolerating without side effects     Vasomotor symptoms of menopause:   Med: Estradiol.   Pt does not wish to come off of this medication at this time.   Aware of the concerns about being on long term.      Environmental and Seasonal:   Med: Allegra as needed.      Microscopic hematuria:   Previously evaluated by Urology Dr Yosvany Renner. On Eliquis.      Anxiety and Depression:   Med: Prozac   Feels very stable on medication  Denies thoughts of suicide or homicide   has bipolar disorder      Past Medical History:   Diagnosis Date    Old myocardial infarction     History of non-ST elevation myocardial infarction (NSTEMI)    Other specified symptoms and signs involving the circulatory and respiratory systems 05/24/2016    Abnormal lung sounds    Personal history of other diseases of the respiratory system 05/24/2016    History of acute sinusitis    Personal history of other diseases of urinary system 10/04/2018    History of hematuria    Personal history of other medical treatment     History of bone density study    Personal history of other medical treatment     History of mammogram    Torn rotator cuff       Past Surgical History:    Procedure Laterality Date    APPENDECTOMY  05/21/2015    Appendectomy    OTHER SURGICAL HISTORY  05/21/2015    Cardiac Cath Lesion 1, 1st Adjunct Treat Device: Stent    OTHER SURGICAL HISTORY  10/20/2019    Complete colonoscopy    OTHER SURGICAL HISTORY  03/30/2022    Cataract surgery    OTHER SURGICAL HISTORY  09/21/2021    Cardioversion    OTHER SURGICAL HISTORY  03/29/2020    Cystourethroscopy    TOTAL ABDOMINAL HYSTERECTOMY  05/21/2015    Total Abdominal Hysterectomy     Family History   Problem Relation Name Age of Onset    Stroke Mother      Coronary artery disease Mother      Coronary artery disease Father        Social History     Socioeconomic History    Marital status:      Spouse name: Not on file    Number of children: Not on file    Years of education: Not on file    Highest education level: Not on file   Occupational History    Not on file   Tobacco Use    Smoking status: Never    Smokeless tobacco: Never   Substance and Sexual Activity    Alcohol use: Yes    Drug use: Never    Sexual activity: Not on file   Other Topics Concern    Not on file   Social History Narrative    Not on file     Social Determinants of Health     Financial Resource Strain: Not on file   Food Insecurity: Not on file   Transportation Needs: Not on file   Physical Activity: Not on file   Stress: Not on file   Social Connections: Not on file   Intimate Partner Violence: Not on file   Housing Stability: Not on file       Current Outpatient Medications on File Prior to Visit   Medication Sig Dispense Refill    apixaban (Eliquis) 5 mg tablet Take 1 tablet (5 mg) by mouth 2 times a day.      aspirin 81 mg EC tablet Take 1 tablet (81 mg) by mouth once daily.      ergocalciferol (Vitamin D-2) 1.25 MG (80465 UT) capsule Take 1 capsule (1,250 mcg) by mouth 1 (one) time per week. 13 capsule 3    estradiol (Afsaneh) 0.05 mg/24 hr patch Place 1 patch on the skin 2 times a week. 8 patch 6    fexofenadine (Allegra) 180 mg tablet Take 1  "tablet (180 mg) by mouth once daily as needed (allergies).      FLUoxetine (PROzac) 10 mg capsule Take 1 capsule (10 mg) by mouth once daily. 90 capsule 3    furosemide (Lasix) 20 mg tablet Take 1 tablet (20 mg) by mouth if needed.      losartan (Cozaar) 50 mg tablet Take 1 tablet (50 mg) by mouth once daily. Take 1tab daily  EPS Dr. LJ Lopez      metoprolol succinate XL (Toprol-XL) 100 mg 24 hr tablet 2 tablets (200 mg) once daily.      pitavastatin calcium (Livalo) 2 mg tablet Take 2 mg by mouth 1 (one) time each day.      thyroid, pork, (NP Thyroid) 60 mg tablet Take 1 tablet (60 mg) by mouth once daily in the morning. Take before meals. 90 tablet 3     No current facility-administered medications on file prior to visit.       Allergies   Allergen Reactions    Azithromycin Other     Gastrointestinal upset    Bupropion Other     tachycardia    Pollen Extracts Other    Bupropion Hcl Palpitations     Tachycardia           Visit Vitals  /68   Pulse 95   Ht 1.575 m (5' 2\")   Wt 80.7 kg (178 lb)   SpO2 97%   BMI 32.56 kg/m²   Smoking Status Never   BSA 1.88 m²        Physical Exam  Gen: Alert and oriented x3 female in no acute distress.  HEENT: Head is normocephalic.  Neck is supple without carotid bruits  Heart: Regular rate and rhythm without murmurs.  Lungs: Clear to auscultation bilaterally.  Breast: Declined.              Pelvic: Declined.         Abdomen: Soft with normal bowel sounds.  No masses or pain to palpation.   Extremities: Good range of motion of all joints.  No significant edema. Pedal pulses +1-2/4  Neuro: No signs of focal neurologic deficit.  No tremor.  Speech and hearing are normal.  DTRs +3/4;  Muscle Strength +5/5.  Musculoskeletal: Using cane   Skin: No significant or irregular nevi visualized.  Psych: normal affect.  No suicidal ideation.  Good judgment and insight.    Diagnosis/Plan:     1. Encounter for Medicare annual wellness exam      2. Mixed hyperlipidemia  Follow-up with " specialist per their discretion/direction.     3. Anxiety  Stable.   Continue current medication/treatment plan.     Aware at any time if thoughts of suicide or homicide are present contact medical services immediately.    - FLUoxetine (PROzac) 10 mg capsule; Take 1 capsule (10 mg) by mouth once daily.  Dispense: 90 capsule; Refill: 3    4. Persistent depressive disorder  Stable.   Continue current medication/treatment plan.     Aware at any time if thoughts of suicide or homicide are present contact medical services immediately.    - FLUoxetine (PROzac) 10 mg capsule; Take 1 capsule (10 mg) by mouth once daily.  Dispense: 90 capsule; Refill: 3    5. Hypothyroidism, unspecified type    - thyroid, pork, (NP Thyroid) 60 mg tablet; Take 1 tablet (60 mg) by mouth once daily in the morning. Take before meals.  Dispense: 90 tablet; Refill: 3    6. Vasomotor symptoms due to menopause    - estradiol (Afsaneh) 0.05 mg/24 hr patch; Place 1 patch on the skin 2 times a week.  Dispense: 24 patch; Refill: 3    7. Vitamin D deficiency    - ergocalciferol (Vitamin D-2) 1.25 MG (41259 UT) capsule; Take 1 capsule (1,250 mcg) by mouth 1 (one) time per week.  Dispense: 13 capsule; Refill: 3    8. Stage 3a chronic kidney disease (Multi)  CMP ordered    9. Chronic atrial fibrillation (Multi)  Follow-up with specialist per their discretion/direction.     10. Environmental and seasonal allergies  Stable.  Continue current medications.      Medications refills will be completed as discussed.     Any labs or testing that is ordered will be reviewed and the results will be in your chart .   You can review these via  [employer].     Follow up 1 year for full exam and MWV    Prescriptions will not be filled unless you are compliant with your follow-up appointments or have a follow-up appointment scheduled as per the instruction of your provider. Refills for medications should be requested at the time of your office visit.     Please allow one week  for refill requests to be completed.     [employer] can help with scheduling referrals: [phone]   Mammogram [phone]  Physical Therapy [phone]    Contact office with any questions or concerns.   Preferred communication is via  [employer]  Please contact [email or web address] if having issues with  [employer]    Rajani Ceballos [facility]  [address], [address]  [place] [address]  Phone: [phone]    **Charting was completed using voice recognition technology and may include unintended errors**

## 2024-08-07 ENCOUNTER — TELEPHONE (OUTPATIENT)
Dept: PRIMARY CARE | Facility: CLINIC | Age: 84
End: 2024-08-07

## 2024-08-07 NOTE — TELEPHONE ENCOUNTER
PA for Thyroid 60 mg denied by insurance even though patient has been on it for years.   They list alternatives as Levothyroxine, Liothyronine, or Synthroid.  Please send one of those in. Thanks.

## 2025-02-11 ENCOUNTER — PATIENT OUTREACH (OUTPATIENT)
Dept: PRIMARY CARE | Facility: CLINIC | Age: 85
End: 2025-02-11
Payer: MEDICARE

## 2025-02-11 NOTE — PROGRESS NOTES
Discharge Facility:[facility]  Discharge Diagnosis:Influenza A  Admission Date:2/4/25  Discharge Date: 2/10/25    PCP Appointment Date:No contact made. Message sent to office  Specialist Appointment Date: TBD  Hospital Encounter and Summary Linked: Yes    2 call attempts made

## 2025-02-14 ENCOUNTER — TELEPHONE (OUTPATIENT)
Dept: PRIMARY CARE | Facility: CLINIC | Age: 85
End: 2025-02-14
Payer: MEDICARE

## 2025-02-14 NOTE — TELEPHONE ENCOUNTER
Chelsy a physical therapist from [facility] is calling to get permission to move pt's OT evaluation to next week? Chelsy can be reached at [phone].

## 2025-02-25 ENCOUNTER — PATIENT OUTREACH (OUTPATIENT)
Dept: PRIMARY CARE | Facility: CLINIC | Age: 85
End: 2025-02-25
Payer: MEDICARE

## 2025-02-26 ENCOUNTER — TELEPHONE (OUTPATIENT)
Dept: PRIMARY CARE | Facility: CLINIC | Age: 85
End: 2025-02-26
Payer: MEDICARE

## 2025-02-26 NOTE — TELEPHONE ENCOUNTER
[facility]lma from [facility] called saying she went to pt's home and pt was not there and apt was confirmed with Lindsay

## 2025-02-26 NOTE — TELEPHONE ENCOUNTER
Pt is calling wanting to schedule a hospital follow up. Pt was in the hospital for the flu. Pt wanted to see if she can do a virtual until she is better and done with her physical therapy? Pt stated she will schedule an in person visit after she is feeling better?

## 2025-02-27 NOTE — TELEPHONE ENCOUNTER
Spoke with pt she states that she is feeling better and will call to schedule after she is feeling better for now. Pt stated she will call to schedule if anything changes.

## 2025-04-08 ENCOUNTER — TELEPHONE (OUTPATIENT)
Dept: PRIMARY CARE | Facility: CLINIC | Age: 85
End: 2025-04-08
Payer: MEDICARE

## 2025-04-08 NOTE — TELEPHONE ENCOUNTER
Thelma from [facility] called and said that Lindsay Shepherd is being discharged from home care.

## 2025-04-14 ENCOUNTER — APPOINTMENT (OUTPATIENT)
Dept: PRIMARY CARE | Facility: CLINIC | Age: 85
End: 2025-04-14
Payer: MEDICARE

## 2025-04-14 VITALS
HEART RATE: 85 BPM | HEIGHT: 62 IN | WEIGHT: 177 LBS | SYSTOLIC BLOOD PRESSURE: 120 MMHG | OXYGEN SATURATION: 97 % | BODY MASS INDEX: 32.57 KG/M2 | DIASTOLIC BLOOD PRESSURE: 72 MMHG

## 2025-04-14 DIAGNOSIS — Z09 HOSPITAL DISCHARGE FOLLOW-UP: Primary | ICD-10-CM

## 2025-04-14 DIAGNOSIS — N18.31 STAGE 3A CHRONIC KIDNEY DISEASE (MULTI): ICD-10-CM

## 2025-04-14 DIAGNOSIS — J10.1 INFLUENZA A: ICD-10-CM

## 2025-04-14 DIAGNOSIS — I48.20 CHRONIC ATRIAL FIBRILLATION (MULTI): ICD-10-CM

## 2025-04-14 PROCEDURE — 99214 OFFICE O/P EST MOD 30 MIN: CPT | Performed by: NURSE PRACTITIONER

## 2025-04-14 PROCEDURE — G2211 COMPLEX E/M VISIT ADD ON: HCPCS | Performed by: NURSE PRACTITIONER

## 2025-04-14 PROCEDURE — 3074F SYST BP LT 130 MM HG: CPT | Performed by: NURSE PRACTITIONER

## 2025-04-14 PROCEDURE — 1123F ACP DISCUSS/DSCN MKR DOCD: CPT | Performed by: NURSE PRACTITIONER

## 2025-04-14 PROCEDURE — 3078F DIAST BP <80 MM HG: CPT | Performed by: NURSE PRACTITIONER

## 2025-04-14 PROCEDURE — 1159F MED LIST DOCD IN RCRD: CPT | Performed by: NURSE PRACTITIONER

## 2025-04-14 ASSESSMENT — PATIENT HEALTH QUESTIONNAIRE - PHQ9
1. LITTLE INTEREST OR PLEASURE IN DOING THINGS: NOT AT ALL
SUM OF ALL RESPONSES TO PHQ9 QUESTIONS 1 & 2: 0
2. FEELING DOWN, DEPRESSED OR HOPELESS: NOT AT ALL

## 2025-04-14 NOTE — PROGRESS NOTES
Subjective   Patient ID: Lindsay Shepherd is a 84 y.o. female who presents for Follow up after home care discharge .    HPI     2/4/25 - 2/10/25  Admitted to [facility]: Influenza A, weakness, afib   Discharged to home with [facility] just completed     States she slowly is improving   Feels like her strength is improving but not at baseline.  No recent falls    Feels like her appetite is normal  Drinking fluids     Feels SOB when walking-recovers quickly when she stops  No SOB when sitting and talking    HTN, Hyperlipidemia, A-Fib and carotid stenosis:   Managed by EPS Dr. Magy Lopez and Cardio Dr Pradeep Esteban   States taking all meds below except Lasix   Meds: Eliquis, Aspirin, Cardizem CD,  Cozaar, Toprol XL, Livalo, Lasix    Denies leg swelling or bloating    Past Medical History:   Diagnosis Date    Old myocardial infarction     History of non-ST elevation myocardial infarction (NSTEMI)    Other specified symptoms and signs involving the circulatory and respiratory systems 05/24/2016    Abnormal lung sounds    Personal history of other diseases of the respiratory system 05/24/2016    History of acute sinusitis    Personal history of other diseases of urinary system 10/04/2018    History of hematuria    Personal history of other medical treatment     History of bone density study    Personal history of other medical treatment     History of mammogram    Torn rotator cuff      Past Surgical History:   Procedure Laterality Date    APPENDECTOMY  05/21/2015    Appendectomy    OTHER SURGICAL HISTORY  05/21/2015    Cardiac Cath Lesion 1, 1st Adjunct Treat Device: Stent    OTHER SURGICAL HISTORY  10/20/2019    Complete colonoscopy    OTHER SURGICAL HISTORY  03/30/2022    Cataract surgery    OTHER SURGICAL HISTORY  09/21/2021    Cardioversion    OTHER SURGICAL HISTORY  03/29/2020    Cystourethroscopy    TOTAL ABDOMINAL HYSTERECTOMY  05/21/2015    Total Abdominal Hysterectomy     Social History     Socioeconomic History  "   Marital status:      Spouse name: Not on file    Number of children: Not on file    Years of education: Not on file    Highest education level: Not on file   Occupational History    Not on file   Tobacco Use    Smoking status: Never    Smokeless tobacco: Never   Substance and Sexual Activity    Alcohol use: Yes     Comment: 1-2 per month    Drug use: Never    Sexual activity: Not on file   Other Topics Concern    Not on file   Social History Narrative    Not on file     Social Drivers of Health     Financial Resource Strain: Not on file   Food Insecurity: No Food Insecurity (2/5/2025)    Received from [facility]    Hunger Vital Sign     Worried About Running Out of Food in the Last Year: Never true     Ran Out of Food in the Last Year: Never true   Transportation Needs: No Transportation Needs (2/5/2025)    Received from [facility]    PRAPARE - Transportation     Lack of Transportation (Medical): No     Lack of Transportation (Non-Medical): No   Physical Activity: Not on file   Stress: Not on file   Social Connections: Not on file   Intimate Partner Violence: Not on file   Housing Stability: Unknown (2/5/2025)    Received from [facility]    Housing Stability Vital Sign     Unable to Pay for Housing in the Last Year: No     Number of Times Moved in the Last Year: Not on file     Homeless in the Last Year: No       Review of Systems    Objective   /72   Pulse 85   Ht 1.575 m (5' 2\")   Wt 80.3 kg (177 lb)   SpO2 97%   BMI 32.37 kg/m²     Physical Exam    Alert and oriented x 3  Neck supple without carotid bruit   Heart: Irregularly irregular rhythm, S1 S2  Lungs clear to auscultation but bases diminished   Legs without edema.  Gait is non-antalgic  Speech clear.  Hearing adequate.  Psych: Normal affect. Good judgment and insight.        Assessment/Plan     1. Hospital discharge follow-up (Primary)  Reviewed hospital records     2. Stage 3a chronic kidney disease (Multi)  - " Comprehensive metabolic panel    3. Influenza A  Resolved.   Continue to remain active to increase strength  Continue hydration and eating well    4. Chronic atrial fibrillation (Multi)  Follow-up with specialist per their discretion/direction.     Follow up: 8/4/25 full exam and MWV    Medications refills will be completed as discussed.     Any labs or testing that is ordered will be reviewed and the results will be in your chart .   You can review these via  [employer].     Prescriptions will not be filled unless you are compliant with your follow-up appointments or have a follow-up appointment scheduled as per the instruction of your provider. Refills for medications should be requested at the time of your office visit.     Please allow one week for refill requests to be completed.     Contact office with any questions or concerns.   Preferred communication is via  [employer]      Call [employer]: [phone] to assist with scheduling.      Rajani [facility]EZ[facility]  [address], [address]  [place] [address]  Phone: [phone]    **Charting was completed using voice recognition technology and may include unintended errors**

## 2025-04-15 LAB
ALBUMIN SERPL-MCNC: 4.1 G/DL (ref 3.6–5.1)
ALP SERPL-CCNC: 48 U/L (ref 37–153)
ALT SERPL-CCNC: 12 U/L (ref 6–29)
ANION GAP SERPL CALCULATED.4IONS-SCNC: 7 MMOL/L (CALC) (ref 7–17)
AST SERPL-CCNC: 16 U/L (ref 10–35)
BILIRUB SERPL-MCNC: 0.5 MG/DL (ref 0.2–1.2)
BUN SERPL-MCNC: 28 MG/DL (ref 7–25)
CALCIUM SERPL-MCNC: 9.3 MG/DL (ref 8.6–10.4)
CHLORIDE SERPL-SCNC: 104 MMOL/L (ref 98–110)
CO2 SERPL-SCNC: 26 MMOL/L (ref 20–32)
CREAT SERPL-MCNC: 0.94 MG/DL (ref 0.6–0.95)
EGFRCR SERPLBLD CKD-EPI 2021: 60 ML/MIN/1.73M2
GLUCOSE SERPL-MCNC: 99 MG/DL (ref 65–99)
POTASSIUM SERPL-SCNC: 4.5 MMOL/L (ref 3.5–5.3)
PROT SERPL-MCNC: 6.8 G/DL (ref 6.1–8.1)
SODIUM SERPL-SCNC: 137 MMOL/L (ref 135–146)

## 2025-08-04 ENCOUNTER — APPOINTMENT (OUTPATIENT)
Dept: PRIMARY CARE | Facility: CLINIC | Age: 85
End: 2025-08-04
Payer: MEDICARE

## 2025-08-04 VITALS
BODY MASS INDEX: 32.39 KG/M2 | HEIGHT: 62 IN | OXYGEN SATURATION: 96 % | WEIGHT: 176 LBS | DIASTOLIC BLOOD PRESSURE: 62 MMHG | HEART RATE: 80 BPM | SYSTOLIC BLOOD PRESSURE: 124 MMHG

## 2025-08-04 DIAGNOSIS — N18.31 STAGE 3A CHRONIC KIDNEY DISEASE (MULTI): ICD-10-CM

## 2025-08-04 DIAGNOSIS — E78.5 HYPERLIPIDEMIA, UNSPECIFIED HYPERLIPIDEMIA TYPE: ICD-10-CM

## 2025-08-04 DIAGNOSIS — I48.19 PERSISTENT ATRIAL FIBRILLATION (MULTI): ICD-10-CM

## 2025-08-04 DIAGNOSIS — M47.817 LUMBOSACRAL SPONDYLOSIS WITHOUT MYELOPATHY: ICD-10-CM

## 2025-08-04 DIAGNOSIS — Z00.00 ENCOUNTER FOR MEDICARE ANNUAL WELLNESS EXAM: ICD-10-CM

## 2025-08-04 DIAGNOSIS — E55.9 VITAMIN D DEFICIENCY: ICD-10-CM

## 2025-08-04 DIAGNOSIS — E03.9 HYPOTHYROIDISM, UNSPECIFIED TYPE: ICD-10-CM

## 2025-08-04 DIAGNOSIS — F41.9 ANXIETY: ICD-10-CM

## 2025-08-04 DIAGNOSIS — F34.1 PERSISTENT DEPRESSIVE DISORDER: Primary | ICD-10-CM

## 2025-08-04 DIAGNOSIS — N95.1 VASOMOTOR SYMPTOMS DUE TO MENOPAUSE: ICD-10-CM

## 2025-08-04 DIAGNOSIS — I10 HYPERTENSION, UNSPECIFIED TYPE: ICD-10-CM

## 2025-08-04 PROCEDURE — 1170F FXNL STATUS ASSESSED: CPT | Performed by: NURSE PRACTITIONER

## 2025-08-04 PROCEDURE — 3074F SYST BP LT 130 MM HG: CPT | Performed by: NURSE PRACTITIONER

## 2025-08-04 PROCEDURE — 1036F TOBACCO NON-USER: CPT | Performed by: NURSE PRACTITIONER

## 2025-08-04 PROCEDURE — 1160F RVW MEDS BY RX/DR IN RCRD: CPT | Performed by: NURSE PRACTITIONER

## 2025-08-04 PROCEDURE — G0439 PPPS, SUBSEQ VISIT: HCPCS | Performed by: NURSE PRACTITIONER

## 2025-08-04 PROCEDURE — 1158F ADVNC CARE PLAN TLK DOCD: CPT | Performed by: NURSE PRACTITIONER

## 2025-08-04 PROCEDURE — 99214 OFFICE O/P EST MOD 30 MIN: CPT | Performed by: NURSE PRACTITIONER

## 2025-08-04 PROCEDURE — 1159F MED LIST DOCD IN RCRD: CPT | Performed by: NURSE PRACTITIONER

## 2025-08-04 PROCEDURE — 3078F DIAST BP <80 MM HG: CPT | Performed by: NURSE PRACTITIONER

## 2025-08-04 RX ORDER — ESTRADIOL 0.05 MG/D
1 FILM, EXTENDED RELEASE TRANSDERMAL 2 TIMES WEEKLY
Qty: 24 PATCH | Refills: 3 | Status: SHIPPED | OUTPATIENT
Start: 2025-08-04

## 2025-08-04 RX ORDER — ERGOCALCIFEROL 1.25 MG/1
1.25 CAPSULE ORAL
Qty: 13 CAPSULE | Refills: 3 | Status: SHIPPED | OUTPATIENT
Start: 2025-08-04

## 2025-08-04 RX ORDER — FLUOXETINE 20 MG/1
20 TABLET ORAL DAILY
Qty: 90 TABLET | Refills: 3 | Status: SHIPPED | OUTPATIENT
Start: 2025-08-04

## 2025-08-04 RX ORDER — THYROID 60 MG/1
60 TABLET ORAL
Qty: 90 TABLET | Refills: 3 | Status: SHIPPED | OUTPATIENT
Start: 2025-08-04

## 2025-08-04 ASSESSMENT — ACTIVITIES OF DAILY LIVING (ADL)
BATHING: INDEPENDENT
MANAGING_FINANCES: INDEPENDENT
DRESSING: INDEPENDENT
GROCERY_SHOPPING: INDEPENDENT
TAKING_MEDICATION: INDEPENDENT
DOING_HOUSEWORK: INDEPENDENT

## 2025-08-04 NOTE — PROGRESS NOTES
Annual Comprehensive Medical Exam:    85 y.o. female presents for annual comprehensive medical evaluation and preventive services screening.      Recent hospitalizations, surgeries or ER visits: denies     Diet:   mix of healthy and unhealthy  Caffeine per day: denies  Water per day:   lots  Exercise: infreq  Alcohol: 1-2 per month  Tobacco: quit 2016  Dentist:      twice a year  Optometrist:   every other year   Pap/Pelvic: aged out  Mammogram: 12/21, declines  DEXA: declines  Colonoscopy: aged out  Cologuard:    Allowed to report any questions or concerns.    HTN, Hyperlipidemia, A-Fib and Carotid Stenosis:   Managed by EPS Dr. Magy Lopez and Cardio Dr Pradeep Esteban   Meds: Eliquis, Aspirin, Cardizem CD, Lasix prn , Cozaar?, Toprol XL, Livalo      Hypothyroidism:   Med: NP Thyroid   Tolerating without side effects     Vasomotor symptoms of menopause:   Med: Estradiol.   Pt does not wish to come off of this medication at this time.   Aware of the concerns about being on long term.      Environmental and Seasonal:   Med: Allegra as needed.      Microscopic hematuria:   Previously evaluated by Urology Dr Yosvany Renner. On Eliquis.      Anxiety and Depression:   Med: Prozac 10 mg   Feels  stable on medication but feels like she could use a little increase  Denies thoughts of suicide or homicide   has bipolar disorder    Spondylolisthesis of lumbar region without myelopathy:   Using wheeled walker due to lower back discomfort and to keep herself steady     Requesting note for meals on wheels    Past Medical History:   Diagnosis Date    Old myocardial infarction     History of non-ST elevation myocardial infarction (NSTEMI)    Other specified symptoms and signs involving the circulatory and respiratory systems 05/24/2016    Abnormal lung sounds    Personal history of other diseases of the respiratory system 05/24/2016    History of acute sinusitis    Personal history of other diseases of urinary system  10/04/2018    History of hematuria    Personal history of other medical treatment     History of bone density study    Personal history of other medical treatment     History of mammogram    Torn rotator cuff       Past Surgical History:   Procedure Laterality Date    APPENDECTOMY  05/21/2015    Appendectomy    OTHER SURGICAL HISTORY  05/21/2015    Cardiac Cath Lesion 1, 1st Adjunct Treat Device: Stent    OTHER SURGICAL HISTORY  10/20/2019    Complete colonoscopy    OTHER SURGICAL HISTORY  03/30/2022    Cataract surgery    OTHER SURGICAL HISTORY  09/21/2021    Cardioversion    OTHER SURGICAL HISTORY  03/29/2020    Cystourethroscopy    TOTAL ABDOMINAL HYSTERECTOMY  05/21/2015    Total Abdominal Hysterectomy     Family History   Problem Relation Name Age of Onset    Stroke Mother      Coronary artery disease Mother      Coronary artery disease Father        Social History     Socioeconomic History    Marital status:      Spouse name: Carlos    Number of children: Not on file    Years of education: Not on file    Highest education level: Not on file   Occupational History    Not on file   Tobacco Use    Smoking status: Never    Smokeless tobacco: Never   Substance and Sexual Activity    Alcohol use: Yes     Comment: 1-2 per month    Drug use: Never    Sexual activity: Not on file   Other Topics Concern    Not on file   Social History Narrative    Not on file     Social Drivers of Health     Financial Resource Strain: Not on file   Food Insecurity: No Food Insecurity (2/5/2025)    Received from [facility]    Hunger Vital Sign     Within the past 12 months, you worried that your food would run out before you got the money to buy more.: Never true     Within the past 12 months, the food you bought just didn't last and you didn't have money to get more.: Never true   Transportation Needs: No Transportation Needs (2/5/2025)    Received from [facility]    PRAPARE - Transportation     Lack of Transportation  (Medical): No     Lack of Transportation (Non-Medical): No   Physical Activity: Not on file   Stress: Not on file   Social Connections: Not on file   Intimate Partner Violence: Not on file   Housing Stability: Unknown (2/5/2025)    Received from [facility]    Housing Stability Vital Sign     In the last 12 months, was there a time when you were not able to pay the mortgage or rent on time?: No     Number of Times Moved in the Last Year: Not on file     At any time in the past 12 months, were you homeless or living in a shelter (including now)?: No       Current Outpatient Medications on File Prior to Visit   Medication Sig Dispense Refill    apixaban (Eliquis) 5 mg tablet Take 1 tablet (5 mg) by mouth 2 times a day.      aspirin 81 mg EC tablet Take 1 tablet (81 mg) by mouth once daily.      dilTIAZem CD (Cardizem CD) 120 mg 24 hr capsule Take by mouth.      ergocalciferol (Vitamin D-2) 1.25 MG (90590 UT) capsule Take 1 capsule (1,250 mcg) by mouth 1 (one) time per week. 13 capsule 3    estradiol (Afsaneh) 0.05 mg/24 hr patch Place 1 patch on the skin 2 times a week. 24 patch 3    fexofenadine (Allegra) 180 mg tablet Take 1 tablet (180 mg) by mouth once daily as needed (allergies).      FLUoxetine (PROzac) 10 mg capsule Take 1 capsule (10 mg) by mouth once daily. 90 capsule 3    furosemide (Lasix) 20 mg tablet Take 1 tablet (20 mg) by mouth if needed.      losartan (Cozaar) 50 mg tablet Take 1 tablet (50 mg) by mouth once daily. Take 1tab daily  EPS Dr. LJ Lopez      metoprolol succinate XL (Toprol-XL) 100 mg 24 hr tablet 2 tablets (200 mg) once daily.      pitavastatin calcium (Livalo) 2 mg tablet Take 2 mg by mouth 1 (one) time each day.      thyroid, pork, (NP Thyroid) 60 mg tablet Take 1 tablet (60 mg) by mouth once daily in the morning. Take before meals. 90 tablet 3     No current facility-administered medications on file prior to visit.       Allergies   Allergen Reactions    Azithromycin Other      "Gastrointestinal upset    Bupropion Other     tachycardia    Pollen Extracts Other    Bupropion Hcl Palpitations     Tachycardia           Visit Vitals  /62   Pulse 80   Ht 1.575 m (5' 2\")   Wt 79.8 kg (176 lb)   SpO2 96%   BMI 32.19 kg/m²   Smoking Status Never   BSA 1.87 m²        Physical Exam  Requested not to get on exam table  Gen: Alert and oriented x3 female  HEENT: Head is normocephalic.  Neck is supple without carotid bruits  Heart: Irregularly irregular rhythm, S1 S2 normal. No murmur.   Lungs: Clear to auscultation bilaterally.  Breast: Declined.              Pelvic: Declined.         Abdomen: Soft with normal bowel sounds.  No masses or pain to palpation.   Extremities: Good range of motion of all joints.  No significant edema.   Neuro: No signs of focal neurologic deficit.  No tremor.  Speech normal.  Hearing slightly decreased.   Musculoskeletal: wheeled walker. Discomfort to lower back when standing up  Skin: No significant or irregular nevi visualized.  Psych: normal affect.  No suicidal ideation.  Good judgment and insight.    Diagnosis/Plan:     Assessment & Plan  Encounter for Medicare annual wellness exam         Hypothyroidism, unspecified type    Orders:    Comprehensive metabolic panel; Future    CBC; Future    TSH; Future    Thyroxine, Free; Future    T3, free; Future    thyroid, pork, (NP Thyroid) 60 mg tablet; Take 1 tablet (60 mg) by mouth once daily in the morning. Take before meals.    Hyperlipidemia, unspecified hyperlipidemia type  Follow-up with specialist per their discretion/direction.   Orders:    Comprehensive metabolic panel; Future    CBC; Future    Lipid panel; Future    Persistent atrial fibrillation (Multi)  Follow-up with specialist per their discretion/direction.   Clarification regarding if she should be taking Cozaar sent to Dr Magy Lopez        Hypertension, unspecified type  Follow-up with specialist per their discretion/direction.   Orders:    Comprehensive " metabolic panel; Future    CBC; Future    Urinalysis with Reflex Microscopic; Future    Stage 3a chronic kidney disease (Multi)    Orders:    Comprehensive metabolic panel; Future    CBC; Future    Urinalysis with Reflex Microscopic; Future    Vitamin D deficiency    Orders:    Vitamin D 25-Hydroxy,Total (for eval of Vitamin D levels); Future    ergocalciferol (Vitamin D-2) 1250 mcg (50,000 units) capsule; Take 1 capsule (1.25 mg) by mouth 1 (one) time per week.    Persistent depressive disorder  Prozac increased  Stable.   Aware at any time if thoughts of suicide or homicide are present contact medical services immediately.    Orders:    FLUoxetine (PROzac) 20 mg tablet; Take 1 tablet (20 mg) by mouth once daily.    Anxiety  Prozac increased  Stable.   Aware at any time if thoughts of suicide or homicide are present contact medical services immediately.  Orders:    FLUoxetine (PROzac) 20 mg tablet; Take 1 tablet (20 mg) by mouth once daily.    Vasomotor symptoms due to menopause  She would like to continue   Orders:    estradiol (Afsaneh) 0.05 mg/24 hr patch; Place 1 patch on the skin 2 times a week.    Lumbosacral spondylosis without myelopathy          You can review these via  [employer].     Follow up 6 months med management, follow up on anxiety and depression     Prescriptions will not be filled unless you are compliant with your follow-up appointments or have a follow-up appointment scheduled as per the instruction of your provider. Refills for medications should be requested at the time of your office visit.     Please allow one week for refill requests to be completed.     [facility] can help with scheduling referrals: [phone]   Mammogram [phone]  Physical Therapy [phone]    Contact office with any questions or concerns.   Preferred communication is via  [employer]  Please contact [email or web address] if having issues with  [employer]    Rajani Ceballos  APRN-[facility]  [address], [address]  [place] [address]  Phone: [phone]    **Charting was completed using voice recognition technology and may include unintended errors**

## 2025-08-04 NOTE — LETTER
To Whom it May Concern,     This letter is to inform you that it is my medical opinion that Lindsay Shepherd (1940) is appropriate to receive Meals On Wheels.  She is in need of a low sodium diet. She has a diagnosis of debility.    Her address is: [address] [place] [address]      Sincerely,           Rajani FREED

## 2025-08-04 NOTE — LETTER
To Whom it May Concern,     This letter is to inform you that it is my medical opinion that Lindsay Shepherd (1940) is appropriate to receive Meals On Wheels.  She is in need of a low sodium diet. She has a diagnosis of debility.    Her address is: [address] [place], [place] [id number]      Sincerely,           Rajani FREED

## 2025-08-05 PROBLEM — I31.39 PERICARDIAL EFFUSION (HHS-HCC): Status: RESOLVED | Noted: 2023-01-04 | Resolved: 2025-08-05

## 2025-08-05 PROBLEM — E66.811 OBESITY, CLASS I, BMI 30-34.9: Status: RESOLVED | Noted: 2021-10-29 | Resolved: 2025-08-05

## 2025-08-05 PROBLEM — M43.16 SPONDYLOLISTHESIS OF LUMBAR REGION: Status: RESOLVED | Noted: 2021-11-23 | Resolved: 2025-08-05

## 2025-08-05 PROBLEM — Z09 HOSPITAL DISCHARGE FOLLOW-UP: Status: RESOLVED | Noted: 2025-04-14 | Resolved: 2025-08-05

## 2025-08-05 PROBLEM — F32.A DEPRESSION, UNSPECIFIED: Status: RESOLVED | Noted: 2023-02-10 | Resolved: 2025-08-05

## 2025-08-05 PROBLEM — M54.16 LUMBAR RADICULOPATHY: Status: RESOLVED | Noted: 2022-01-21 | Resolved: 2025-08-05

## 2025-08-05 PROBLEM — S46.019A STRAIN OF ROTATOR CUFF CAPSULE: Status: RESOLVED | Noted: 2023-04-13 | Resolved: 2025-08-05

## 2025-08-05 NOTE — ASSESSMENT & PLAN NOTE
Prozac increased  Stable.   Aware at any time if thoughts of suicide or homicide are present contact medical services immediately.  Orders:    FLUoxetine (PROzac) 20 mg tablet; Take 1 tablet (20 mg) by mouth once daily.

## 2025-08-05 NOTE — ASSESSMENT & PLAN NOTE
Orders:    Comprehensive metabolic panel; Future    CBC; Future    Urinalysis with Reflex Microscopic; Future

## 2025-08-05 NOTE — ASSESSMENT & PLAN NOTE
Orders:    Comprehensive metabolic panel; Future    CBC; Future    TSH; Future    Thyroxine, Free; Future    T3, free; Future    thyroid, pork, (NP Thyroid) 60 mg tablet; Take 1 tablet (60 mg) by mouth once daily in the morning. Take before meals.

## 2025-08-05 NOTE — ASSESSMENT & PLAN NOTE
Follow-up with specialist per their discretion/direction.   Orders:    Comprehensive metabolic panel; Future    CBC; Future    Urinalysis with Reflex Microscopic; Future

## 2025-08-05 NOTE — ASSESSMENT & PLAN NOTE
She would like to continue   Orders:    estradiol (Afsaneh) 0.05 mg/24 hr patch; Place 1 patch on the skin 2 times a week.

## 2025-08-05 NOTE — ASSESSMENT & PLAN NOTE
Follow-up with specialist per their discretion/direction.   Clarification regarding if she should be taking Cozaar sent to Dr Magy Lopez

## 2025-08-05 NOTE — ASSESSMENT & PLAN NOTE
Follow-up with specialist per their discretion/direction.   Orders:    Comprehensive metabolic panel; Future    CBC; Future    Lipid panel; Future

## 2025-08-05 NOTE — ASSESSMENT & PLAN NOTE
Orders:    Vitamin D 25-Hydroxy,Total (for eval of Vitamin D levels); Future    ergocalciferol (Vitamin D-2) 1250 mcg (50,000 units) capsule; Take 1 capsule (1.25 mg) by mouth 1 (one) time per week.

## 2026-02-04 ENCOUNTER — APPOINTMENT (OUTPATIENT)
Dept: PRIMARY CARE | Facility: CLINIC | Age: 86
End: 2026-02-04
Payer: MEDICARE